# Patient Record
Sex: MALE | URBAN - METROPOLITAN AREA
[De-identification: names, ages, dates, MRNs, and addresses within clinical notes are randomized per-mention and may not be internally consistent; named-entity substitution may affect disease eponyms.]

---

## 2018-06-25 ENCOUNTER — TRANSFERRED RECORDS (OUTPATIENT)
Dept: HEALTH INFORMATION MANAGEMENT | Facility: CLINIC | Age: 58
End: 2018-06-25

## 2018-06-26 ENCOUNTER — OFFICE VISIT (OUTPATIENT)
Dept: OPHTHALMOLOGY | Facility: CLINIC | Age: 58
End: 2018-06-26
Attending: OPHTHALMOLOGY
Payer: COMMERCIAL

## 2018-06-26 ENCOUNTER — TRANSFERRED RECORDS (OUTPATIENT)
Dept: HEALTH INFORMATION MANAGEMENT | Facility: CLINIC | Age: 58
End: 2018-06-26

## 2018-06-26 DIAGNOSIS — Z96.1 PSEUDOPHAKIA OF BOTH EYES: ICD-10-CM

## 2018-06-26 DIAGNOSIS — Z98.890 HISTORY OF LASER ASSISTED IN SITU KERATOMILEUSIS: ICD-10-CM

## 2018-06-26 DIAGNOSIS — D31.31 CHOROIDAL NEVUS OF RIGHT EYE: ICD-10-CM

## 2018-06-26 DIAGNOSIS — H33.022 RETINAL DETACHMENT OF LEFT EYE WITH MULTIPLE RETINAL TEARS: Primary | ICD-10-CM

## 2018-06-26 PROCEDURE — 92134 CPTRZ OPH DX IMG PST SGM RTA: CPT | Mod: ZF | Performed by: OPHTHALMOLOGY

## 2018-06-26 PROCEDURE — G0463 HOSPITAL OUTPT CLINIC VISIT: HCPCS | Mod: ZF

## 2018-06-26 PROCEDURE — 92250 FUNDUS PHOTOGRAPHY W/I&R: CPT | Mod: ZF | Performed by: OPHTHALMOLOGY

## 2018-06-26 RX ORDER — FLUOXETINE 40 MG/1
CAPSULE ORAL DAILY
COMMUNITY
Start: 2017-09-13

## 2018-06-26 RX ORDER — LOSARTAN POTASSIUM AND HYDROCHLOROTHIAZIDE 12.5; 5 MG/1; MG/1
1 TABLET ORAL DAILY
COMMUNITY
Start: 2017-09-13

## 2018-06-26 RX ORDER — METOPROLOL TARTRATE 25 MG/1
25 TABLET, FILM COATED ORAL DAILY
COMMUNITY
Start: 2017-09-13

## 2018-06-26 RX ORDER — PRAVASTATIN SODIUM 40 MG
40 TABLET ORAL DAILY
COMMUNITY
Start: 2017-09-13

## 2018-06-26 RX ORDER — EZETIMIBE 10 MG/1
10 TABLET ORAL DAILY
COMMUNITY
Start: 2017-09-13

## 2018-06-26 RX ORDER — ASPIRIN 81 MG/1
81 TABLET ORAL DAILY
COMMUNITY

## 2018-06-26 RX ORDER — CARBAMAZEPINE 200 MG/1
1 CAPSULE, EXTENDED RELEASE ORAL 2 TIMES DAILY
COMMUNITY
Start: 2018-02-01

## 2018-06-26 ASSESSMENT — CUP TO DISC RATIO
OD_RATIO: 0.3
OS_RATIO: 0.3

## 2018-06-26 ASSESSMENT — VISUAL ACUITY
OS_SC+: -2
METHOD: SNELLEN - LINEAR
OS_SC: 20/25
OD_SC: 20/20

## 2018-06-26 ASSESSMENT — EXTERNAL EXAM - LEFT EYE: OS_EXAM: NORMAL

## 2018-06-26 ASSESSMENT — SLIT LAMP EXAM - LIDS
COMMENTS: NORMAL
COMMENTS: NORMAL

## 2018-06-26 ASSESSMENT — CONF VISUAL FIELD
OD_NORMAL: 1
OS_SUPERIOR_NASAL_RESTRICTION: 3
OS_INFERIOR_NASAL_RESTRICTION: 3

## 2018-06-26 ASSESSMENT — EXTERNAL EXAM - RIGHT EYE: OD_EXAM: NORMAL

## 2018-06-26 ASSESSMENT — TONOMETRY
OS_IOP_MMHG: 10
IOP_METHOD: TONOPEN
OD_IOP_MMHG: 10

## 2018-06-26 NOTE — NURSING NOTE
Chief Complaints and History of Present Illnesses   Patient presents with     New Patient     partial RD on left eye     HPI    Affected eye(s):  Left   Symptoms:     Decreased vision   Floaters   Flashes      Duration:  2 weeks      Do you have eye pain now?:  No      Comments:  New patient is here for left eye RD.  The patient has had flashes of light the last 3 days.  RACHEL Chew 10:00 AM 06/26/2018

## 2018-06-26 NOTE — PROGRESS NOTES
CC -   Retinal detachment OS     INTERVAL HISTORY - Initial visit.  No change since seeing Dr. Lutz     KAREN -   Godfrey Lala is a  58 year old year-old patient referred by Dr Moisés Lutz for evaluation and treat of a possible retinal detachment. Noted significant new floaters OS 2 weeks ago (6/12/18) upon awakening. Denies eye trauma or other inciting incident. Saw eye doctor in [place], [place] 6/13/18 who did not see any pathology. Then saw Dr. Lutz 6/18/18 without pathology. Then patient began seeing flashing light and dark area nasally 3 days ago (6/23/18). Central vision a little blurry OS.   No trauama    Last food 6:30am today      PAST OCULAR SURGERY  LASIK both eyes 2003 [place]  CE/IOL both eyes 1/2017 (Dr. Taylor)    RETINAL IMAGING:  OCT   OD - retina normal , PHF attached  OS -  SRF to fovea    ASSESSMENT & PLAN  1.  RRD OS   - new onset mac on   - multiple tears, near-giant tear OS   - advise PPV/SBP/FGx     - r/b/a d/w patient: vision loss, blindness, infection, bleeding   - retinal detachment, need for more surgeries, need for bubble and bubble restrictions   - participation of fellow or resident      2. Choroidal nevus OD   - photos today 6/2018    3.  Syneresis vs PVD OU   - advised S/Sx RD      return to clinic: postop     Katalina Weeks MD   Ophthalmology PGY-3      ATTESTATION     Attending Attestation:     Complete documentation of historical and exam elements from today's encounter can be found in the full encounter summary report (not reduplicated in this progress note).  I personally obtained the chief complaint(s) and history of present illness.  I confirmed and edited as necessary the review of systems, past medical/surgical history, family history, social history, and examination findings as documented by others; and I examined the patient myself.  I personally reviewed the relevant tests, images, and reports as documented above.  I personally reviewed the ophthalmic test(s)  associated with this encounter, agree with the interpretation(s) as documented by the resident/fellow, and have edited the corresponding report(s) as necessary.   I formulated and edited as necessary the assessment and plan and discussed the findings and management plan with the patient and family    Janae Brown MD, PhD  , Vitreoretinal Surgery  Department of Ophthalmology  [facility]

## 2018-06-26 NOTE — MR AVS SNAPSHOT
After Visit Summary   2018    Godfrey Lala    MRN: [id number]           Patient Information     Date Of Birth          1960        Visit Information        Provider Department      2018 9:45 AM Janae Brown MD [facility]        Today's Diagnoses     Retinal detachment of left eye with multiple retinal tears    -  1    Choroidal nevus of right eye        Pseudophakia of both eyes        History of laser assisted in situ keratomileusis           Follow-ups after your visit        Your next 10 appointments already scheduled     2018  7:45 AM CDT   Post-Op with Yeny Ledesma MD   Eye Clinic ([facility])    [address]  9 [address] Clin [address] [place] [address] [phone]              Who to contact     Please call your clinic at [phone] to:    Ask questions about your health    Make or cancel appointments    Discuss your medicines    Learn about your test results    Speak to your doctor            Additional Information About Your Visit        MyChart Information     thinkingphones is an electronic gateway that provides easy, online access to your medical records. With thinkingphones, you can request a clinic appointment, read your test results, renew a prescription or communicate with your care team.     To sign up for thinkingphones visit the website at [email or web address]   You will be asked to enter the access code listed below, as well as some personal information. Please follow the directions to create your username and password.     Your access code is: [email or web address]  Expires: 2018  6:31 AM     Your access code will  in 90 days. If you need help or a new code, please contact your [facility] or call [phone] for assistance.        Care EveryWhere ID     This is your Care EveryWhere ID. This could be used by other organizations to access your [facility]  records  [id number]         Blood Pressure from Last 3 Encounters:   No data found for BP    Weight from Last 3 Encounters:   No data found for Wt              We Performed the Following     Fundus Photos OU (both eyes)     OCT Retina Spectralis OU (both eyes)     Belem-Operative Worksheet (Retina)     Belem-Operative Worksheet        Primary Care Provider Office Phone # Fax #    Oziel Gage [phone]       [address] [address]        Equal Access to Services     GLEN MARSHALL : Hadii aad ku hadasho Soomaali, waaxda luqadaha, qaybta kaalmada adeegyada, waxay idiin hayaan adeeg kharadexter valverde. So [facility] [phone].    ATENCIÓN: Si habla español, tiene a serrano disposición servicios gratuitos de asistencia lingüística. [facility] [phone].    We comply with applicable federal civil rights laws and [place] laws. We do not discriminate on the basis of race, color, national origin, age, disability, sex, sexual orientation, or gender identity.            Thank you!     Thank you for choosing EYE CLINIC  for your care. Our goal is always to provide you with excellent care. Hearing back from our patients is one way we can continue to improve our services. Please take a few minutes to complete the written survey that you may receive in the mail after your visit with us. Thank you!             Your Updated Medication List - Protect others around you: Learn how to safely use, store and throw away your medicines at www.disposemymeds.org.          This list is accurate as of 6/26/18 12:50 PM.  Always use your most recent med list.                   Brand Name Dispense Instructions for use Diagnosis    aspirin 81 MG EC tablet      81 mg daily        carBAMazepine 200 MG 12 hr capsule    CARBATROL     1 capsule 2 times daily        ezetimibe 10 MG tablet    ZETIA     10 mg daily        FLUoxetine 40 MG capsule    PROzac     daily        losartan-hydrochlorothiazide  50-12.5 MG per tablet    HYZAAR     1 tablet daily        metoprolol tartrate 25 MG tablet    LOPRESSOR     25 mg daily        pravastatin 40 MG tablet    PRAVACHOL     40 mg daily

## 2018-06-27 ENCOUNTER — TRANSFERRED RECORDS (OUTPATIENT)
Dept: HEALTH INFORMATION MANAGEMENT | Facility: CLINIC | Age: 58
End: 2018-06-27

## 2018-09-12 ENCOUNTER — TRANSFERRED RECORDS (OUTPATIENT)
Dept: HEALTH INFORMATION MANAGEMENT | Facility: CLINIC | Age: 58
End: 2018-09-12

## 2018-10-02 ENCOUNTER — TRANSFERRED RECORDS (OUTPATIENT)
Dept: HEALTH INFORMATION MANAGEMENT | Facility: CLINIC | Age: 58
End: 2018-10-02

## 2018-10-02 ENCOUNTER — TELEPHONE (OUTPATIENT)
Dept: OPTOMETRY | Facility: CLINIC | Age: 58
End: 2018-10-02

## 2018-10-02 NOTE — TELEPHONE ENCOUNTER
Referral from dr. Haider   Retina detachment    S/p retina detachment in June 2018  Left eye with dr. tran    Pt seen by dr. Haider today    Today Right eye retina detachment  S/p laser for tear on right eye 9-24-18     Shallow sup/tem detachment today    Macula on-- 20/20-2 vision right eye today    S/p psuedophakia both eyes    Will review with Retina team plan of care    Nahid Sauceda RN 11:21 AM 10/02/18

## 2018-10-02 NOTE — TELEPHONE ENCOUNTER
Reviewed with retina clinic  Evaluation tomorrow with dr. harper  Likely surgery with dr. Brown on Thursday with post-op Friday    Pt aware of plan  Pt having pre-op today at 3:30 PM  Reviewed may have light breakfast in AM  Notes were faxed per referring    Note to retina fellow/facilitator for surgical orders if applicable    Nahid Sauceda RN 12:25 PM 10/02/18

## 2018-10-03 ENCOUNTER — OFFICE VISIT (OUTPATIENT)
Dept: OPHTHALMOLOGY | Facility: CLINIC | Age: 58
End: 2018-10-03
Attending: OPHTHALMOLOGY
Payer: COMMERCIAL

## 2018-10-03 ENCOUNTER — TELEPHONE (OUTPATIENT)
Dept: OPHTHALMOLOGY | Facility: CLINIC | Age: 58
End: 2018-10-03

## 2018-10-03 DIAGNOSIS — H33.21 RIGHT RETINAL DETACHMENT: Primary | ICD-10-CM

## 2018-10-03 PROCEDURE — 92250 FUNDUS PHOTOGRAPHY W/I&R: CPT | Mod: ZF,59 | Performed by: OPHTHALMOLOGY

## 2018-10-03 PROCEDURE — G0463 HOSPITAL OUTPT CLINIC VISIT: HCPCS | Mod: ZF

## 2018-10-03 PROCEDURE — 92134 CPTRZ OPH DX IMG PST SGM RTA: CPT | Mod: ZF | Performed by: OPHTHALMOLOGY

## 2018-10-03 ASSESSMENT — VISUAL ACUITY
OS_PH_SC: 20/20
OD_SC: 20/20
METHOD: SNELLEN - LINEAR
OS_SC+: +2
OS_SC: 20/50
OD_SC+: -1

## 2018-10-03 ASSESSMENT — SLIT LAMP EXAM - LIDS
COMMENTS: NORMAL
COMMENTS: NORMAL

## 2018-10-03 ASSESSMENT — TONOMETRY
OD_IOP_MMHG: 14
IOP_METHOD: TONOPEN
OS_IOP_MMHG: 06

## 2018-10-03 ASSESSMENT — EXTERNAL EXAM - RIGHT EYE: OD_EXAM: NORMAL

## 2018-10-03 ASSESSMENT — EXTERNAL EXAM - LEFT EYE: OS_EXAM: NORMAL

## 2018-10-03 ASSESSMENT — CONF VISUAL FIELD
OS_NORMAL: 1
METHOD: COUNTING FINGERS
OD_NORMAL: 1

## 2018-10-03 ASSESSMENT — CUP TO DISC RATIO
OS_RATIO: 0.3
OD_RATIO: 0.3

## 2018-10-03 NOTE — LETTER
10/3/2018       RE: Godfrey Lala  [address]  [facility] [address]     Dear Kaleb,    Thank you for referring your patient, Godfrey Lala, to the EYE CLINIC at [facility]. Please see a copy of my visit note below.    CC -   New Retinal detachment right eye, s/p Retinal detachment Left eye       INTERVAL HISTORY - has been following with Dr Haider and found to have a superior temporal Retinal detachment Right eye with 2 breaks temporally - s/p laser retinopexy by Dr. Haider on 10/24/18. No issues with the left eye.    HPI -   Godfrey Lala is a  58 year old year-old patient referred by Dr Haider for evaluation and treat of a possible retinal detachment right eye. Was treated for Rhegmatogenous retinal detachment left eye by Dr. DOZIER in June 2018. Reports floater and flashes in the right eye alongside blurry vision.    Last food 5 am today    PAST OCULAR SURGERY  LASIK both eyes 2003 [place]  CE/IOL both eyes 1/2017 (Dr. Taylor)  PPV/SBP/FGX left eye 6/2018 (Dr. Brown)    RETINAL IMAGING:  OCT  10/03/18  OD - Normal macula, shallow detachment right eye  OS - Mild Epiretinal membrane    Photos: consistent with exam    ASSESSMENT & PLAN  1.  RRD OD   - new onset mac on   - multiple tears superior temporal and temporally   - advise PPV/SBP/FAX/ SB right eye   - r/b/a d/w patient: vision loss, blindness, infection, bleeding   - retinal detachment, need for more surgeries, need for bubble and bubble restrictions   - participation of fellow or resident    I reviewed the indications, risks, benefits, and alternatives of the proposed surgical procedure including, but not limited to, failure to improve vision or further loss of vision,  and need for additional surgery, bleeding, infection, loss of vision and the remote possibility of complications of anesthesia. 1:1000 risk of infection/bleed/loss of eye; 1:100 risk of RD and need for further surgery. Patient aware of  prolonged healing after retinal surgery (up to a year after surgery) as well as possibility of a gas/SO  instillation into eye. Patient agreed to proceed with surgery.  I provided multiple opportunities for the questions, answered all questions to the best of my ability, and confirmed that my answers and my discussion were understood.     2. H/O Rhegmatogenous retinal detachment left eye 6.18   - s/p PPV/SB/EL/FAX 14% C3F8   - attached, monitor    3. Choroidal nevus OD   - photos 6/2018    4.  Syneresis vs PVD OU   - advised S/Sx RD    5. Pseudophakia both eyes    - Stable     return to clinic: postop then follow with Dr. Haider    Again, thank you for allowing me to participate in the care of your patient.      Sincerely,    Yeny Ledesma MD     Retina Service   Department of Ophthalmology and Visual Neurosciences   [facility]  Phone:  [phone]   Fax:  [phone]

## 2018-10-03 NOTE — TELEPHONE ENCOUNTER
Patient is scheduled for surgery with Dr. Brown   Spoke or left message with: patient    Date of Surgery: 10/04/18    Location: [facility]    Informed patient they will need an adult  Yes    Pre-op with surgeon (if applicable): [facility]    H&P: Scheduled with Dr. Lalito Page faxed to [facility]    Additional imaging/appointments: [facility]    Surgery packet: Gave to patient    Additional comments: Advised need adult surgery day and 1 day post op

## 2018-10-03 NOTE — NURSING NOTE
Chief Complaints and History of Present Illnesses   Patient presents with     Consult For     Retina detachment RE per Dr. Haider     HPI    Affected eye(s):  Right   Symptoms:     Floaters   No flashes   No redness   No tearing   No Dryness         Do you have eye pain now?:  No      Comments:  Pt here for an evaluation for a Retina detachment RE per Dr. Haider. Pt states last Monday Dr. Haider did a quick laser repair of RE. Pt did a follow up with Dr. Haider yesterday AM and the doctor noticed more tearing in RE. Vision in RE is a little blurry, and is noticing some floaters. Pt notes no flashes. Pt c/o eye strain BE since LE RD surgery in June 2018.     MOOSE Hammond 10:44 AM October 3, 2018

## 2018-10-03 NOTE — PROGRESS NOTES
CC -   New Retinal detachment right eye, s/p Retinal detachment Left eye       INTERVAL HISTORY - has been following with Dr Haider and found to have a superior temporal Retinal detachment Right eye with 2 breaks temporally - s/p laser retinopexy by Dr. Haider on 10/24/18. No issues with the left eye.    HPI -   Godfrey Lala is a  58 year old year-old patient referred by Dr Haider for evaluation and treat of a possible retinal detachment right eye. Was treated for Rhegmatogenous retinal detachment left eye by Dr. DOZIER in June 2018. Reports floater and flashes in the right eye alongside blurry vision.    Last food 5 am today    PAST OCULAR SURGERY  LASIK both eyes 2003 [place]  CE/IOL both eyes 1/2017 (Dr. Taylor)  PPV/SBP/FGX left eye 6/2018 (Dr. Brown)    RETINAL IMAGING:  OCT  10/03/18  OD - Normal macula, shallow detachment right eye  OS - Mild Epiretinal membrane    Photos: consistent with exam    ASSESSMENT & PLAN  1.  RRD OD   - new onset mac on   - multiple tears superior temporal and temporally   - advise PPV/SBP/FAX/ SB right eye   - r/b/a d/w patient: vision loss, blindness, infection, bleeding   - retinal detachment, need for more surgeries, need for bubble and bubble restrictions   - participation of fellow or resident    I reviewed the indications, risks, benefits, and alternatives of the proposed surgical procedure including, but not limited to, failure to improve vision or further loss of vision,  and need for additional surgery, bleeding, infection, loss of vision and the remote possibility of complications of anesthesia. 1:1000 risk of infection/bleed/loss of eye; 1:100 risk of RD and need for further surgery. Patient aware of prolonged healing after retinal surgery (up to a year after surgery) as well as possibility of a gas/SO  instillation into eye. Patient agreed to proceed with surgery.  I provided multiple opportunities for the questions, answered all questions to the best of my  ability, and confirmed that my answers and my discussion were understood.     2. H/O Rhegmatogenous retinal detachment left eye 6.18   - s/p PPV/SB/EL/FAX 14% C3F8   - attached, monitor    3. Choroidal nevus OD   - photos 6/2018    4.  Syneresis vs PVD OU   - advised S/Sx RD    5. Pseudophakia both eyes    - Stable     return to clinic: postop then follow with Dr. Vitaly Ta MD  PGY-3 Ophthalmology  ~~~~~~~~~~~~~~~~~~~~~~~~~~~~~~~~~~   Complete documentation of historical and exam elements from today's encounter can be found in the full encounter summary report (not reduplicated in this progress note).  I personally obtained the chief complaint(s) and history of present illness.  I confirmed and edited as necessary the review of systems, past medical/surgical history, family history, social history, and examination findings as documented by others; and I examined the patient myself.  I personally reviewed the relevant tests, images, and reports as documented above.  I personally reviewed the ophthalmic test(s) associated with this encounter, agree with the interpretation(s) as documented by the resident/fellow, and have edited the corresponding report(s) as necessary.   I formulated and edited as necessary the assessment and plan and discussed the findings and management plan with the patient and family    Yeny Ledesma MD   of Ophthalmology.  Retina Service   Department of Ophthalmology and Visual Neurosciences   [facility]  Phone: [phone]   Fax: [phone]

## 2018-10-03 NOTE — MR AVS SNAPSHOT
After Visit Summary   10/3/2018    Godfrey Lala    MRN: [id number]           Patient Information     Date Of Birth          1960        Visit Information        Provider Department      10/3/2018 10:45 AM Yeny Ledesma MD [facility]        Today's Diagnoses     Right retinal detachment    -  1       Follow-ups after your visit        Your next 10 appointments already scheduled     Oct 05, 2018  8:00 AM CDT   Post-Op with Janae Brown MD   Eye Clinic ([facility])    [address]  [facility] [address] [place] [address]   [phone]              Who to contact     Please call your clinic at [phone] to:    Ask questions about your health    Make or cancel appointments    Discuss your medicines    Learn about your test results    Speak to your doctor            Additional Information About Your Visit        MyChart Information     [employer] is an electronic gateway that provides easy, online access to your medical records. With [employer], you can request a clinic appointment, read your test results, renew a prescription or communicate with your care team.     To sign up for [employer] visit the website at [email or web address]   You will be asked to enter the access code listed below, as well as some personal information. Please follow the directions to create your username and password.     Your access code is: [email or web address]  Expires: 2019  6:31 AM     Your access code will  in 90 days. If you need help or a new code, please contact your [facility] or call [phone] for assistance.        Care EveryWhere ID     This is your Care EveryWhere ID. This could be used by other organizations to access your [place] medical records  [id number]         Blood Pressure from Last 3 Encounters:   No data found for BP    Weight from Last 3 Encounters:   No data found for Wt              We  Performed the Following     Fundus Photos OU (both eyes)     OCT Retina Spectralis OU (both eyes)     Belem-Operative Worksheet (Retina)        Primary Care Provider Office Phone # Fax #    Oziel Gage [phone]       [address] [address]        Equal Access to Services     GLEN MARSHALL : Hadii aad ku hadasho Soomaali, waaxda luqadaha, qaybta kaalmada adeegyada, waxay keyonin hayaan adeamber kharash sandy . So [facility] [phone].    ATENCIÓN: Si habla español, tiene a serrano disposición servicios gratuitos de asistencia lingüística. [facility] [phone].    We comply with applicable federal civil rights laws and [place] laws. We do not discriminate on the basis of race, color, national origin, age, disability, sex, sexual orientation, or gender identity.            Thank you!     Thank you for choosing EYE CLINIC  for your care. Our goal is always to provide you with excellent care. Hearing back from our patients is one way we can continue to improve our services. Please take a few minutes to complete the written survey that you may receive in the mail after your visit with us. Thank you!             Your Updated Medication List - Protect others around you: Learn how to safely use, store and throw away your medicines at www.disposemymeds.org.          This list is accurate as of 10/3/18  1:48 PM.  Always use your most recent med list.                   Brand Name Dispense Instructions for use Diagnosis    aspirin 81 MG EC tablet      81 mg daily        carBAMazepine 200 MG 12 hr capsule    CARBATROL     1 capsule 2 times daily        ezetimibe 10 MG tablet    ZETIA     10 mg daily        FLUoxetine 40 MG capsule    PROzac     daily        losartan-hydrochlorothiazide 50-12.5 MG per tablet    HYZAAR     1 tablet daily        metoprolol tartrate 25 MG tablet    LOPRESSOR     25 mg daily        pravastatin 40 MG tablet    PRAVACHOL     40 mg daily

## 2018-10-04 ENCOUNTER — TRANSFERRED RECORDS (OUTPATIENT)
Dept: HEALTH INFORMATION MANAGEMENT | Facility: CLINIC | Age: 58
End: 2018-10-04

## 2018-10-05 ENCOUNTER — OFFICE VISIT (OUTPATIENT)
Dept: OPHTHALMOLOGY | Facility: CLINIC | Age: 58
End: 2018-10-05
Attending: OPHTHALMOLOGY
Payer: COMMERCIAL

## 2018-10-05 DIAGNOSIS — Z98.890 POST-OPERATIVE STATE: Primary | ICD-10-CM

## 2018-10-05 PROCEDURE — G0463 HOSPITAL OUTPT CLINIC VISIT: HCPCS | Mod: ZF

## 2018-10-05 RX ORDER — PREDNISOLONE ACETATE 10 MG/ML
1 SUSPENSION/ DROPS OPHTHALMIC
COMMUNITY
Start: 2018-10-04

## 2018-10-05 RX ORDER — ATROPINE SULFATE 10 MG/ML
1 SOLUTION/ DROPS OPHTHALMIC
COMMUNITY
Start: 2018-10-04

## 2018-10-05 RX ORDER — POLYMYXIN B SULFATE AND TRIMETHOPRIM 1; 10000 MG/ML; [USP'U]/ML
1 SOLUTION OPHTHALMIC
COMMUNITY
Start: 2018-10-04

## 2018-10-05 ASSESSMENT — TONOMETRY
IOP_METHOD: ICARE
OS_IOP_MMHG: 09
OD_IOP_MMHG: 15

## 2018-10-05 ASSESSMENT — VISUAL ACUITY
METHOD: SNELLEN - LINEAR
OD_SC: CF @ 2FT
OS_SC: 20/80
OS_PH_SC: 20/25-1

## 2018-10-05 ASSESSMENT — SLIT LAMP EXAM - LIDS: COMMENTS: NORMAL

## 2018-10-05 NOTE — LETTER
10/5/2018       RE: Godfrey Lala  [address]  [facility] [address]     Dear Colleague,    Thank you for referring your patient, Godfrey Lala, to the EYE CLINIC at [facility]. Please see a copy of my visit note below.    CC -   Post Op OD    INTERVAL HISTORY -POD #1 OD, s/p PPV/FGx/EL  Did well overnight, no pain, OK positioning    HPI -   Godfrey Lala is a  58 year old year-old patient referred by Dr Moisés Lutz for evaluation and treat of a possible retinal detachment OS 6/2018.  Subsequent RD found OD 10/2018 by Dr. DERIC Haider.      PAST OCULAR SURGERY  LASIK both eyes 2003 [place]  CE/IOL both eyes 1/2017 (Dr. Taylor)  PPV/SBP/FGx C3F8 OS 6/27/18  PPV/FGx C3F8 /EL OD 10/4/18    RETINAL IMAGING:  OCT 6/26/18  OD - retina normal , PHF attached  OS -  SRF to fovea    ASSESSMENT & PLAN  1.  POD #1 OD   - s/p PPV/FGx C3F8/EL OD 10/4/18   - mac- on superior with multiple breaks scattered 12,3, 6   - retina flat, no infection, IOP OK    - face down x 6 days then upright daytime and sleep LHS down     - PT/AT/PF gtts   - f/u 1 week with Vitaly    2.  H/o RD OS   - s/p PPV/SBP/FGx OS 6/27/18 (DDK)   - retina flat        3. Choroidal nevus OD   - photos today 6/2018          [facility]      ATTESTATION   Attending Attestation: Complete documentation of historical and exam elements from today's encounter can be found in the full encounter summary report (not reduplicated in this progress note).  I personally obtained the chief complaint(s) and history of present illness.  I confirmed and edited as necessary the review of systems, past medical/surgical history, family history, social history, and examination findings as documented by others; and I examined the patient myself.  I personally reviewed the relevant tests, images, and reports as documented above.  I formulated and edited as necessary the assessment and plan and discussed the  findings and management plan with the patient and family. Janae Brown MD, PhD      Base Eye Exam     Visual Acuity (Snellen - Linear)      Right Left   Dist sc CF @ 2ft 20/80   Dist ph sc  20/25-1         Tonometry ([facility], 8:37 AM)      Right Left   Pressure 15 09         Neuro/Psych     Oriented x3:  Yes    Mood/Affect:  Normal      Dilation     Right eye:  1.0% Mydriacyl, 2.5% Drew Synephrine @ 8:37 AM            Slit Lamp and Fundus Exam     Slit Lamp Exam      Right Left    Lids/Lashes Normal     Conjunctiva/Sclera 1+ inj/aimee     Cornea clear     Anterior Chamber deep, 1+ cell     Iris dilated     Lens PCL     Vitreous 90% C3F8       Fundus Exam      Right Left    Disc Normal     Macula flat     Vessels normal     Periphery flat, breaks 12, 3, 6, pexy 360                 Again, thank you for allowing me to participate in the care of your patient.      Sincerely,    Janae Brown MD, PhD  , Vitreoretinal Surgery  Department of Ophthalmology & Visual Neurosciences  [facility]

## 2018-10-05 NOTE — NURSING NOTE
Chief Complaints and History of Present Illnesses   Patient presents with     Post Op (Ophthalmology) Right Eye     1st day post op Vit RE     HPI    Affected eye(s):  Right   Symptoms:     No floaters   No flashes   Redness (Comment: Pt states BE are red consistantly since surgery )   No tearing   No Dryness         Do you have eye pain now?:  No      Comments:  Pt here for his 1st day post op RE Vit. Pt states no pain in RE. Vision still very blurry in RE. Pt can see light with RE.     MOOSE Hammond 8:31 AM October 5, 2018

## 2018-10-05 NOTE — MR AVS SNAPSHOT
After Visit Summary   10/5/2018    Godfrey Lala    MRN: [id number]           Patient Information     Date Of Birth          1960        Visit Information        Provider Department      10/5/2018 8:00 AM [facility]        Today's Diagnoses     Post-operative state    -  1      Care Instructions    POST-OPERATIVE INSTRUCTIONS FOLLOWING RETINA SURGERY    Janae Brown MD, PhD  Department of Ophthalmology  [facility]  [phone]      ACTIVITY:    No heavy lifting for 2 weeks after surgery.    No swimming for 3 weeks after surgery.    It is OK for you to shower, to wash your face, and to wash your hair.  Allow the shower to hit the top of your head and wash down your face.  Do not hit your eye directly with the jet from the shower.    Keep your eye covered with the shield when you sleep for 1 week after surgery.  If your shield has a tab, it is designed to go over the bridge of your nose.  Place one piece of tape diagonally from the center of your forehead to the side of your cheek to secure the shield.     During the daytime, you can use either the shield or your regular eyeglasses or sunglasses to protect your eye.    GAS BUBBLE POSITIONING REQUIREMENTS  Positioning requirements will be discussed with you if you have an oil or gas bubble in your eye:    Position:    Face Down    Maintain this positioning for 6 days.   Then upright daytime, sleep left side down      When positioning, it is OK to take brief breaks to eat, stretch, clean up, etc.  Try to position for 90% of the time (5 minute break per hour on average).    Do not lay flat on your back until the bubble is gone.    Do not fly on an airplane or travel to elevations more than 1000 feet above [place] until the bubble is gone.    Keep the green bracelet on your wrist until the bubble is gone.  If it breaks, we can give you a replacement      EYE DROPS  Use these drops after surgery.   When using more than one drop, separate them by 3 minutes between drops.  Common times to place drops are breakfast, lunch, dinner, and bedtime.  Do not stop your drops without discussing with our office.  If you run out before your appointment, call and we will send in a refill.      Atropine ---  1 times per day  Polytrim (polymyxin B / trimethoprim) --- 4 times per day  Pred Forte (prednisolone acetate) --- 4 times per day    WHAT TO EXPECT  It is common for the eye to to have a blood tinged discharge for a few days after surgery  It may feel irritated (as if something were in your eye), for there to be clear discharge (thicker in the mornings upon awakening), and for it to be bloodshot for 2-3 weeks following retina surgery.   Your vision is also commonly decreased during this time due to the bubble.          WHAT TO WATCH OUT FOR  If you experience any of the following, you should call immediately:    Increasing pain    Increasing nausea or vomiting    Increasing redness    Worsening or darkening of the vision    New flashing lights or floaters      For any of the symptoms listed above, or for other concerns, call [phone] and ask to speak to the clinic nurse.  If you call after hours, follow to prompts to reach the doctor on call.                    Follow-ups after your visit        Follow-up notes from your care team     Return in about 1 week (around 10/12/2018).      Who to contact     Please call your clinic at [phone] to:    Ask questions about your health    Make or cancel appointments    Discuss your medicines    Learn about your test results    Speak to your doctor            Additional Information About Your Visit        Biglion Information     Biglion is an electronic gateway that provides easy, online access to your medical records. With Biglion, you can request a clinic appointment, read your test results, renew a prescription or communicate with your care team.     To sign up for  Innat visit the website at [email or web address]   You will be asked to enter the access code listed below, as well as some personal information. Please follow the directions to create your username and password.     Your access code is: [email or web address]  Expires: 2019  6:31 AM     Your access code will  in 90 days. If you need help or a new code, please contact your [facility] or call [phone] for assistance.        Care EveryWhere ID     This is your Care EveryWhere ID. This could be used by other organizations to access your [place] medical records  [id number]         Blood Pressure from Last 3 Encounters:   No data found for BP    Weight from Last 3 Encounters:   No data found for Wt              Today, you had the following     No orders found for display       Primary Care Provider Office Phone # Fax #    Oziel Gage [phone]       [address] [address]        Equal Access to Services     GRACY MARSHALL : Hadii aad ku hadasho Soomaali, waaxda luqadaha, qaybta kaalmada adeegyada, waxay idiin hayshaen delphine jarrellaradexter delgado . So [facility] [phone].    ATENCIÓN: Si habla español, tiene a serrano disposición servicios gratuitos de asistencia lingüística. Ll[facility] [phone].    We comply with applicable federal civil rights laws and [place] laws. We do not discriminate on the basis of race, color, national origin, age, disability, sex, sexual orientation, or gender identity.            Thank you!     Thank you for choosing [facility]  for your care. Our goal is always to provide you with excellent care. Hearing back from our patients is one way we can continue to improve our services. Please take a few minutes to complete the written survey that you may receive in the mail after your visit with us. Thank you!             Your Updated Medication List - Protect others around you: Learn how to safely use,  store and throw away your medicines at www.disposemymeds.org.          This list is accurate as of 10/5/18  9:18 AM.  Always use your most recent med list.                   Brand Name Dispense Instructions for use Diagnosis    aspirin 81 MG EC tablet      81 mg daily        atropine 1 % ophthalmic solution      Apply 1 drop to eye        carBAMazepine 200 MG 12 hr capsule    CARBATROL     1 capsule 2 times daily        ezetimibe 10 MG tablet    ZETIA     10 mg daily        FLUoxetine 40 MG capsule    PROzac     daily        losartan-hydrochlorothiazide 50-12.5 MG per tablet    HYZAAR     1 tablet daily        metoprolol tartrate 25 MG tablet    LOPRESSOR     25 mg daily        pravastatin 40 MG tablet    PRAVACHOL     40 mg daily        prednisoLONE acetate 1 % ophthalmic susp    PRED FORTE     Apply 1 drop to eye        trimethoprim-polymyxin b ophthalmic solution    POLYTRIM     Apply 1 drop to eye

## 2018-10-05 NOTE — PATIENT INSTRUCTIONS
POST-OPERATIVE INSTRUCTIONS FOLLOWING RETINA SURGERY    Janae Brown MD, PhD  Department of Ophthalmology  [facility]  [phone]      ACTIVITY:    No heavy lifting for 2 weeks after surgery.    No swimming for 3 weeks after surgery.    It is OK for you to shower, to wash your face, and to wash your hair.  Allow the shower to hit the top of your head and wash down your face.  Do not hit your eye directly with the jet from the shower.    Keep your eye covered with the shield when you sleep for 1 week after surgery.  If your shield has a tab, it is designed to go over the bridge of your nose.  Place one piece of tape diagonally from the center of your forehead to the side of your cheek to secure the shield.     During the daytime, you can use either the shield or your regular eyeglasses or sunglasses to protect your eye.    GAS BUBBLE POSITIONING REQUIREMENTS  Positioning requirements will be discussed with you if you have an oil or gas bubble in your eye:    Position:    Face Down    Maintain this positioning for 6 days.   Then upright daytime, sleep left side down      When positioning, it is OK to take brief breaks to eat, stretch, clean up, etc.  Try to position for 90% of the time (5 minute break per hour on average).    Do not lay flat on your back until the bubble is gone.    Do not fly on an airplane or travel to elevations more than 1000 feet above [place] until the bubble is gone.    Keep the green bracelet on your wrist until the bubble is gone.  If it breaks, we can give you a replacement      EYE DROPS  Use these drops after surgery.  When using more than one drop, separate them by 3 minutes between drops.  Common times to place drops are breakfast, lunch, dinner, and bedtime.  Do not stop your drops without discussing with our office.  If you run out before your appointment, call and we will send in a refill.      Atropine ---  1 times per day  Polytrim (polymyxin B /  trimethoprim) --- 4 times per day  Pred Forte (prednisolone acetate) --- 4 times per day    WHAT TO EXPECT  It is common for the eye to to have a blood tinged discharge for a few days after surgery  It may feel irritated (as if something were in your eye), for there to be clear discharge (thicker in the mornings upon awakening), and for it to be bloodshot for 2-3 weeks following retina surgery.   Your vision is also commonly decreased during this time due to the bubble.          WHAT TO WATCH OUT FOR  If you experience any of the following, you should call immediately:    Increasing pain    Increasing nausea or vomiting    Increasing redness    Worsening or darkening of the vision    New flashing lights or floaters      For any of the symptoms listed above, or for other concerns, call [phone] and ask to speak to the clinic nurse.  If you call after hours, follow to prompts to reach the doctor on call.

## 2018-10-05 NOTE — PROGRESS NOTES
CC -   Post Op OD    INTERVAL HISTORY -POD #1 OD, s/p PPV/FGx/EL  Did well overnight, no pain, OK positioning    HPI -   Godfrey Lala is a  58 year old year-old patient referred by Dr Moisés Lutz for evaluation and treat of a possible retinal detachment OS 6/2018.  Subsequent RD found OD 10/2018 by Dr. DERIC Haider.      PAST OCULAR SURGERY  LASIK both eyes 2003 [place]  CE/IOL both eyes 1/2017 (Dr. Taylor)  PPV/SBP/FGx C3F8 OS 6/27/18  PPV/FGx C3F8 /EL OD 10/4/18    RETINAL IMAGING:  OCT 6/26/18  OD - retina normal , PHF attached  OS -  SRF to fovea    ASSESSMENT & PLAN  1.  POD #1 OD   - s/p PPV/FGx C3F8/EL OD 10/4/18   - mac- on superior with multiple breaks scattered 12,3, 6   - retina flat, no infection, IOP OK    - face down x 6 days then upright daytime and sleep LHS down     - PT/AT/PF gtts   - f/u 1 week with V[facility]    2.  H/o RD OS   - s/p PPV/SBP/FGx OS 6/27/18 (DD[facility])   - retina flat        3. Choroidal nevus OD   - photos today 6/2018          RTC 1 week with Vitaly in [place]  [place] for routine eye care          ATTESTATION     Attending Attestation:     Complete documentation of historical and exam elements from today's encounter can be found in the full encounter summary report (not reduplicated in this progress note).  I personally obtained the chief complaint(s) and history of present illness.  I confirmed and edited as necessary the review of systems, past medical/surgical history, family history, social history, and examination findings as documented by others; and I examined the patient myself.  I personally reviewed the relevant tests, images, and reports as documented above.  I formulated and edited as necessary the assessment and plan and discussed the findings and management plan with the patient and family    Janae Brown MD, PhD  , Vitreoretinal Surgery  Department of Ophthalmology  [facility]

## 2018-10-05 NOTE — LETTER
October 5, 2018      Re: Godfrey FULTON Spike   1960    To Whom It May Concern:    This is to confirm that the above patient was seen on 10/5/2018.  He had Retinal Detachment repair with a gas bubble done 10/4/2018 to the right eye. He had Retinal detachment repairwith a gas bubble on 6/27/2018 to the left eye.  Godfrey Lala is unable to fly until the gas bubble is gone in roughly 3 months.    Thank you for your cooperation in this matter.  Please do not hesitate to contact me if you have any further questions.    Sincerely,      RENEE YAÑEZ

## 2018-12-19 ENCOUNTER — TELEPHONE (OUTPATIENT)
Dept: OPHTHALMOLOGY | Facility: CLINIC | Age: 58
End: 2018-12-19

## 2018-12-19 NOTE — TELEPHONE ENCOUNTER
[facility] Call Center    Phone Message    May a detailed message be left on voicemail: yes    Reason for Call: Other: Pt is planning on going to [facility] next Wednesday and just wants to make sure that is okay with the little bubble in his eye since he will be going up in elevation. Please give him a call back.      Action Taken: Message routed to:  [facility] ([facility] Eye

## 2018-12-19 NOTE — TELEPHONE ENCOUNTER
Pt states less than 20% gas bubble left still from retina detachment repair October 5th, 2019    Pt driving to [place] next week and will be at about 6000 ft elevation     Would like to review if ok to travel    Will review with Dr. Boles and call back  Nahid Sauceda RN 11:38 AM 12/19/18

## 2018-12-20 NOTE — TELEPHONE ENCOUNTER
Gradual changes in altitude are generally safer but not completely risk free. Recommend a visit with Dr. Haider or us prior to travel to evaluate the gas bubble    --   Message above from dr. Huitron  Spoke to pt and reviewed recommendations from dr. huitron    Pt states will f/u with dr. Haider for evaluation and clearance for travel  Nahid Sauceda RN 9:38 AM 12/20/18